# Patient Record
Sex: MALE | Race: WHITE | Employment: UNEMPLOYED | RURAL
[De-identification: names, ages, dates, MRNs, and addresses within clinical notes are randomized per-mention and may not be internally consistent; named-entity substitution may affect disease eponyms.]

---

## 2021-01-15 ENCOUNTER — HOSPITAL ENCOUNTER (OUTPATIENT)
Dept: NEUROLOGY | Facility: HOSPITAL | Age: 36
Discharge: HOME | End: 2021-01-15
Payer: COMMERCIAL

## 2021-01-15 PROCEDURE — 95910 NRV CNDJ TEST 7-8 STUDIES: CPT

## 2021-01-15 PROCEDURE — 95886 MUSC TEST DONE W/N TEST COMP: CPT

## 2022-04-18 ENCOUNTER — TELEPHONE (OUTPATIENT)
Dept: SURGERY | Facility: CLINIC | Age: 37
End: 2022-04-18

## 2022-09-20 ENCOUNTER — TELEPHONE (OUTPATIENT)
Dept: SURGERY | Facility: CLINIC | Age: 37
End: 2022-09-20

## 2022-11-18 ENCOUNTER — TELEPHONE (OUTPATIENT)
Dept: SURGERY | Facility: CLINIC | Age: 37
End: 2022-11-18

## 2022-11-18 NOTE — TELEPHONE ENCOUNTER
Patient no showed for appointment with Dr French. Called and spoke with patient he said he forgot asked to be scheduled in [place]

## 2023-02-14 ENCOUNTER — TELEPHONE (OUTPATIENT)
Dept: SURGERY | Facility: CLINIC | Age: 38
End: 2023-02-14
Payer: COMMERCIAL

## 2023-02-14 NOTE — TELEPHONE ENCOUNTER
PT STATES HE WILL CALL BACK TO  R/S WHEN READY TO R/S 12/15/2022.   PCP NOTIFIED PT HAS NO SHOWING.